# Patient Record
Sex: MALE | Race: NATIVE HAWAIIAN OR OTHER PACIFIC ISLANDER
[De-identification: names, ages, dates, MRNs, and addresses within clinical notes are randomized per-mention and may not be internally consistent; named-entity substitution may affect disease eponyms.]

---

## 2018-10-11 ENCOUNTER — HOSPITAL ENCOUNTER (OUTPATIENT)
Dept: HOSPITAL 5 - OR | Age: 68
Discharge: HOME | End: 2018-10-11
Attending: UROLOGY
Payer: MEDICARE

## 2018-10-11 VITALS — DIASTOLIC BLOOD PRESSURE: 81 MMHG | SYSTOLIC BLOOD PRESSURE: 129 MMHG

## 2018-10-11 DIAGNOSIS — Z79.899: ICD-10-CM

## 2018-10-11 DIAGNOSIS — I10: ICD-10-CM

## 2018-10-11 DIAGNOSIS — N20.1: Primary | ICD-10-CM

## 2018-10-11 DIAGNOSIS — K21.9: ICD-10-CM

## 2018-10-11 DIAGNOSIS — Z79.84: ICD-10-CM

## 2018-10-11 DIAGNOSIS — M19.90: ICD-10-CM

## 2018-10-11 DIAGNOSIS — E66.9: ICD-10-CM

## 2018-10-11 DIAGNOSIS — Z87.442: ICD-10-CM

## 2018-10-11 DIAGNOSIS — E11.9: ICD-10-CM

## 2018-10-11 DIAGNOSIS — Z87.891: ICD-10-CM

## 2018-10-11 PROCEDURE — 50590 FRAGMENTING OF KIDNEY STONE: CPT

## 2018-10-11 PROCEDURE — 82962 GLUCOSE BLOOD TEST: CPT

## 2018-10-11 NOTE — SHORT STAY SUMMARY
Short Stay Documentation


Date of service: 10/11/18





- History


H&P: obtained from office





- Allergies and Medications


Current Medications: 


 Allergies





No Known Allergies Allergy (Verified 10/10/18 15:02)


 





 Home Medications











 Medication  Instructions  Recorded  Confirmed  Last Taken  Type


 


Diclofenac Sodium 50 mg PO BID 10/10/18 10/10/18 Unknown History


 


Fluticasone [Flonase] 1 spray NS QDAY 10/10/18 10/10/18 Unknown History


 


Lisinopril [Zestril] 30 mg PO DAILY 10/10/18 10/10/18 Unknown History


 


Loratadine [Claritin] 10 mg PO DAILY 10/10/18 10/10/18 Unknown History


 


Omeprazole 40 mg PO DAILY 10/10/18 10/10/18 Unknown History


 


glipiZIDE [Glipizide] 5 mg PO BID 10/10/18 10/10/18 Unknown History


 


metFORMIN [Glucophage] 500 mg PO BID 10/10/18 10/10/18 Unknown History








Active Medications





Cefazolin Sodium (Ancef/Sterile Water 2 Gm/20 Ml)  2 gm IV PREOP NR


   Stop: 10/11/18 15:00


Hydromorphone HCl (Dilaudid)  0.5 mg IV Q10MIN PRN


   PRN Reason: Pain , Severe (7-10)


   Stop: 10/11/18 22:00


Lactated Ringer's (Lactated Ringers)  1,000 mls @ 100 mls/hr IV AS DIRECT MALLY


Midazolam HCl (Versed)  2 mg IV PREOP NR


   Stop: 10/11/18 23:59


Ondansetron HCl (Zofran)  4 mg IV ONCE PRN


   PRN Reason: Nausea And Vomiting


   Stop: 10/11/18 15:00











- Brief post op/procedure progress note


Date of procedure: 10/11/18


Pre-op diagnosis: left ureteral stone 6mm


Post-op diagnosis: same


Procedure: 





eswl


Anesthesia: GETA


Surgeon: ESTUARDO SIDDIQUI


Estimated blood loss: none


Condition: stable





- Hospital course


Hospital course: 








percocet & post op info on chart





- Disposition


Condition at discharge: Stable


Disposition: DC-01 TO HOME OR SELFCARE





Short Stay Discharge Plan


Follow up with: 


PRIMARY CARE,MD [Primary Care Provider] - 7 Days

## 2018-10-11 NOTE — ANESTHESIA CONSULTATION
Anesthesia Consult and Med Hx


Date of service: 10/11/18





- Airway


Anesthetic Teeth Evaluation: Chipped


ROM Head & Neck: Adequate


Mental/Hyoid Distance: Adequate


Mallampati Class: Class III


Intubation Access Assessment: Possibly Difficult





- Pulmonary Exam


CTA: Yes





- Cardiac Exam


Cardiac Exam: RRR





- Pre-Operative Health Status


ASA Pre-Surgery Classification: ASA3


Proposed Anesthetic Plan: General





- Pulmonary


Hx Smoking: Yes (1 cig/day; quit 25 yrs ago)


Hx Asthma: No


Hx Respiratory Symptoms: No


SOB: No


Hx Sleep Apnea: No (DANIELA PRE SCREEN HIGH RISK.)





- Cardiovascular System


Hx Hypertension: Yes


Hx Heart Attack/AMI: No





- Central Nervous System


Hx Seizures: No


CVA: No





- Gastrointestinal


Hx Gastroesophageal Reflux Disease: Yes (well controlled)





- Endocrine


Hx Renal Disease: No (renal stones)


Hx Liver Disease: No


Hx Non-Insulin Dependent Diabetes: Yes


Hx Thyroid Disease: No





- Other Systems


Hx Obesity: Yes





- Additional Comments


Anesthesia Medical History Comments: No hx anesthetic complications.

## 2018-10-11 NOTE — OPERATIVE REPORT
PREOPERATIVE DIAGNOSIS:  Left ureteral stone, 6 mm.



POSTOPERATIVE DIAGNOSIS:  Left ureteral stone, 6 mm.



PROCEDURE:  Extracorporal shock wave lithotripsy.



SURGEON:  Bola Richardson MD



ANESTHESIA:  General.



ESTIMATED BLOOD LOSS:  Minimal.



FLUIDS:  Crystalloid.



COMPLICATIONS:  No complications.



INDICATIONS:  This patient is a 68-year-old gentleman seen by Dr. Feliciano in

the office with flank pain.  KUB revealed a 6 mm stone.  He presents now for

surgical intervention.  His daughter is also present.



DESCRIPTION OF PROCEDURE:  The patient was taken to the operative suite, placed

in a supine position.  After adequate general anesthesia, the stone was

localized in 2 planes using fluoroscopy.  Extracorporal shock wave lithotripsy

was administered in maximum kV of 10 titrating with a slow titration, a total of

3000 shocks.  There was some fragmentation of his stone.  He tolerated the

procedure well and was extubated and taken to recovery room.  He will go home on

Percocet, strain his urine.





DD: 10/11/2018 11:24

DT: 10/11/2018 13:50

JOB# [id number]  [id number]

[facility]/RUTHIE